# Patient Record
Sex: MALE | Race: WHITE
[De-identification: names, ages, dates, MRNs, and addresses within clinical notes are randomized per-mention and may not be internally consistent; named-entity substitution may affect disease eponyms.]

---

## 2018-06-06 ENCOUNTER — HOSPITAL ENCOUNTER (INPATIENT)
Dept: HOSPITAL 74 - YASAS | Age: 58
LOS: 2 days | Discharge: LEFT BEFORE DISCHARGE | DRG: 770 | End: 2018-06-08
Attending: SURGERY | Admitting: SURGERY
Payer: COMMERCIAL

## 2018-06-06 VITALS — BODY MASS INDEX: 22.1 KG/M2

## 2018-06-06 DIAGNOSIS — J42: ICD-10-CM

## 2018-06-06 DIAGNOSIS — F14.20: ICD-10-CM

## 2018-06-06 DIAGNOSIS — I25.2: ICD-10-CM

## 2018-06-06 DIAGNOSIS — F19.24: ICD-10-CM

## 2018-06-06 DIAGNOSIS — B18.2: ICD-10-CM

## 2018-06-06 DIAGNOSIS — F10.230: ICD-10-CM

## 2018-06-06 DIAGNOSIS — E11.9: ICD-10-CM

## 2018-06-06 DIAGNOSIS — Z21: ICD-10-CM

## 2018-06-06 DIAGNOSIS — Z87.898: ICD-10-CM

## 2018-06-06 DIAGNOSIS — I25.10: ICD-10-CM

## 2018-06-06 DIAGNOSIS — G62.9: ICD-10-CM

## 2018-06-06 DIAGNOSIS — J45.909: ICD-10-CM

## 2018-06-06 DIAGNOSIS — F13.230: ICD-10-CM

## 2018-06-06 DIAGNOSIS — M21.331: ICD-10-CM

## 2018-06-06 DIAGNOSIS — F11.23: Primary | ICD-10-CM

## 2018-06-06 DIAGNOSIS — F17.210: ICD-10-CM

## 2018-06-06 RX ADMIN — ROSUVASTATIN CALCIUM SCH MG: 20 TABLET, FILM COATED ORAL at 18:54

## 2018-06-06 RX ADMIN — SULFAMETHOXAZOLE AND TRIMETHOPRIM SCH EACH: 800; 160 TABLET ORAL at 18:34

## 2018-06-06 RX ADMIN — BACLOFEN SCH MG: 10 TABLET ORAL at 22:50

## 2018-06-06 RX ADMIN — Medication SCH MG: at 22:49

## 2018-06-06 RX ADMIN — GABAPENTIN SCH MG: 300 CAPSULE ORAL at 22:49

## 2018-06-06 RX ADMIN — LISINOPRIL SCH MG: 5 TABLET ORAL at 18:35
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
## 2018-06-07 LAB
ALBUMIN SERPL-MCNC: 3.1 G/DL (ref 3.4–5)
ALP SERPL-CCNC: 116 U/L (ref 45–117)
ALT SERPL-CCNC: 30 U/L (ref 12–78)
ANION GAP SERPL CALC-SCNC: 9 MMOL/L (ref 8–16)
APPEARANCE UR: CLEAR
AST SERPL-CCNC: 30 U/L (ref 15–37)
BILIRUB SERPL-MCNC: 0.4 MG/DL (ref 0.2–1)
BILIRUB UR STRIP.AUTO-MCNC: NEGATIVE MG/DL
BUN SERPL-MCNC: 20 MG/DL (ref 7–18)
CALCIUM SERPL-MCNC: 8.8 MG/DL (ref 8.5–10.1)
CHLORIDE SERPL-SCNC: 101 MMOL/L (ref 98–107)
CO2 SERPL-SCNC: 27 MMOL/L (ref 21–32)
COLOR UR: (no result)
CREAT SERPL-MCNC: 0.8 MG/DL (ref 0.7–1.3)
DEPRECATED RDW RBC AUTO: 13.4 % (ref 11.9–15.9)
EPITH CASTS URNS QL MICRO: (no result) /HPF
GLUCOSE SERPL-MCNC: 72 MG/DL (ref 74–106)
HCT VFR BLD CALC: 38.8 % (ref 35.4–49)
HGB BLD-MCNC: 12.8 GM/DL (ref 11.7–16.9)
KETONES UR QL STRIP: NEGATIVE
LEUKOCYTE ESTERASE UR QL STRIP.AUTO: NEGATIVE
MCH RBC QN AUTO: 26.7 PG (ref 25.7–33.7)
MCHC RBC AUTO-ENTMCNC: 33.1 G/DL (ref 32–35.9)
MCV RBC: 80.6 FL (ref 80–96)
MUCOUS THREADS URNS QL MICRO: (no result)
NITRITE UR QL STRIP: NEGATIVE
PH UR: 5 [PH] (ref 5–8)
PLATELET # BLD AUTO: 130 K/MM3 (ref 134–434)
PMV BLD: 9.6 FL (ref 7.5–11.1)
POTASSIUM SERPLBLD-SCNC: 4.2 MMOL/L (ref 3.5–5.1)
PROT SERPL-MCNC: 7.5 G/DL (ref 6.4–8.2)
PROT UR QL STRIP: (no result)
PROT UR QL STRIP: NEGATIVE
RBC # BLD AUTO: 4.82 M/MM3 (ref 4–5.6)
RBC # UR STRIP: NEGATIVE /UL
SODIUM SERPL-SCNC: 137 MMOL/L (ref 136–145)
SP GR UR: 1.03 (ref 1–1.03)
UROBILINOGEN UR STRIP-MCNC: 2 MG/DL (ref 0.2–1)
WBC # BLD AUTO: 2.8 K/MM3 (ref 4–10)

## 2018-06-07 PROCEDURE — HZ2ZZZZ DETOXIFICATION SERVICES FOR SUBSTANCE ABUSE TREATMENT: ICD-10-PCS | Performed by: SURGERY

## 2018-06-07 RX ADMIN — LISINOPRIL SCH MG: 5 TABLET ORAL at 10:33

## 2018-06-07 RX ADMIN — ROSUVASTATIN CALCIUM SCH MG: 20 TABLET, FILM COATED ORAL at 10:29

## 2018-06-07 RX ADMIN — GABAPENTIN SCH MG: 300 CAPSULE ORAL at 14:46

## 2018-06-07 RX ADMIN — BACLOFEN SCH MG: 10 TABLET ORAL at 22:10

## 2018-06-07 RX ADMIN — NICOTINE SCH MG: 14 PATCH, EXTENDED RELEASE TRANSDERMAL at 10:29

## 2018-06-07 RX ADMIN — SULFAMETHOXAZOLE AND TRIMETHOPRIM SCH EACH: 800; 160 TABLET ORAL at 10:29

## 2018-06-07 RX ADMIN — Medication SCH TAB: at 10:29

## 2018-06-07 RX ADMIN — CLOPIDOGREL BISULFATE SCH MG: 75 TABLET, FILM COATED ORAL at 10:29

## 2018-06-07 RX ADMIN — GABAPENTIN SCH MG: 300 CAPSULE ORAL at 22:10

## 2018-06-07 RX ADMIN — ASPIRIN SCH MG: 81 TABLET, COATED ORAL at 10:29

## 2018-06-07 RX ADMIN — GABAPENTIN SCH MG: 300 CAPSULE ORAL at 06:04

## 2018-06-07 RX ADMIN — Medication SCH MG: at 22:10

## 2018-06-07 RX ADMIN — BACLOFEN SCH MG: 10 TABLET ORAL at 10:29

## 2018-06-07 NOTE — EKG
Test Reason : 

Blood Pressure : ***/*** mmHG

Vent. Rate : 079 BPM     Atrial Rate : 079 BPM

   P-R Int : 158 ms          QRS Dur : 092 ms

    QT Int : 362 ms       P-R-T Axes : 084 058 -56 degrees

   QTc Int : 415 ms

 

NORMAL SINUS RHYTHM

VOLTAGE CRITERIA FOR LEFT VENTRICULAR HYPERTROPHY

NONSPECIFIC T WAVE ABNORMALITY

ABNORMAL ECG

NO PREVIOUS ECGS AVAILABLE

Confirmed by TYLER FUNEZ MD ([id number]) on 6/7/2018 11:43:29 AM

 

Referred By:             Confirmed By:TYLER FUNEZ MD
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
## 2018-06-07 NOTE — PN
S CIWA





- CIWA Score


Nausea/Vomitin-No Nausea/No Vomiting


Muscle Tremors: 4-Moderate,w/Arms Extend


Anxiety: 4-Mod. Anxious/Guarded


Agitation: 4-Moderately Restless


Paroxysmal Sweats: 1-Minimal Palms Moist


Orientation: 0-Oriented


Tacttile Disturbances: 0-None


Auditory Disturbances: 0-None


Visual Disturbances: 0-None


Headache: 0-None Present


CIWA-Ar Total Score: 13





BHS COWS





- Scale


Resting Pulse: 0= NC 80 or Below


Sweatin= Chills/Flushing


Restless Observation: 3= Extraneous Movement


Pupil Size: 2= Moderately Dilated


Bone or Joint Aches: 4=Acute Joint/Muscle Pain


Runny Nose/ Eye Tearin= Nasal Congestion


GI Upset > 30mins: 0= None


Tremor Observation of Outstretched Hands: 1= Tremor Felt, Not Seen


Yawning Observation: 0= None


Anxiety or Irritability: 2=Irritable/Anxious


Goose Flesh Skin: 0=Smooth Skin


COWS Score: 14





BHS Progress Note (SOAP)


Subjective: 





ANXIETY,IRRITABILITY. C/O WAKING UP WITH RIGHT WRIST TO FINGER NUMBNESS/SLIGHT 

PAIN AND UNABLE TO FLEX WRIST.


Objective: 





18 14:52


 Vital Signs











  18





  09:55 14:43


 


Temperature 98.1 F 98.3 F


 


Pulse Rate 53 L 66


 


Respiratory 18 18





Rate  


 


Blood Pressure 110/54 95/63








 Laboratory Tests











  18





  22:00 08:00 08:00


 


WBC   2.8 L 


 


RBC   4.82 


 


Hgb   12.8 


 


Hct   38.8 


 


MCV   80.6 


 


MCH   26.7 


 


MCHC   33.1 


 


RDW   13.4 


 


Plt Count   130 L 


 


MPV   9.6 


 


Sodium    137


 


Potassium    4.2


 


Chloride    101


 


Carbon Dioxide    27


 


Anion Gap    9


 


BUN    20 H


 


Creatinine    0.8


 


Creat Clearance w eGFR    > 60


 


Random Glucose    72 L


 


Calcium    8.8


 


Total Bilirubin    0.4


 


AST    30


 


ALT    30


 


Alkaline Phosphatase    116


 


Total Protein    7.5


 


Albumin    3.1 L


 


Urine Color  Laura  


 


Urine Appearance  Clear  


 


Urine pH  5.0  


 


Ur Specific Gravity  1.029  


 


Urine Protein  2+ H  


 


Urine Glucose (UA)  Negative  


 


Urine Ketones  Negative  


 


Urine Blood  Negative  


 


Urine Nitrite  Negative  


 


Urine Bilirubin  Negative  


 


Urine Urobilinogen  2.0  


 


Ur Leukocyte Esterase  Negative  


 


Urine WBC (Auto)  1  


 


Urine RBC (Auto)  2  


 


Ur Epithelial Cells  Rare  


 


Urine Mucus  Many  











Assessment: 





18 14:52


WITHDRAWAL SX


WRIST DROP





Plan: 





CONTINUE DETOX


MOTRIN PRN


SELF MASSAGE OF AREA/PASSIVE ROM

## 2018-06-08 VITALS — HEART RATE: 60 BPM | SYSTOLIC BLOOD PRESSURE: 104 MMHG | DIASTOLIC BLOOD PRESSURE: 64 MMHG

## 2018-06-08 VITALS — TEMPERATURE: 97 F

## 2018-06-08 RX ADMIN — BACLOFEN SCH MG: 10 TABLET ORAL at 10:17

## 2018-06-08 RX ADMIN — ROSUVASTATIN CALCIUM SCH MG: 20 TABLET, FILM COATED ORAL at 10:17

## 2018-06-08 RX ADMIN — CLOPIDOGREL BISULFATE SCH MG: 75 TABLET, FILM COATED ORAL at 10:17

## 2018-06-08 RX ADMIN — GABAPENTIN SCH MG: 300 CAPSULE ORAL at 05:39

## 2018-06-08 RX ADMIN — ASPIRIN SCH MG: 81 TABLET, COATED ORAL at 10:17

## 2018-06-08 RX ADMIN — Medication SCH TAB: at 10:17

## 2018-06-08 RX ADMIN — NICOTINE SCH MG: 14 PATCH, EXTENDED RELEASE TRANSDERMAL at 10:18

## 2018-06-08 RX ADMIN — SULFAMETHOXAZOLE AND TRIMETHOPRIM SCH EACH: 800; 160 TABLET ORAL at 10:17

## 2018-06-08 RX ADMIN — LISINOPRIL SCH: 5 TABLET ORAL at 10:18

## 2018-06-08 RX ADMIN — GABAPENTIN SCH MG: 300 CAPSULE ORAL at 14:31
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
## 2018-06-08 NOTE — PN
S CIWA





- CIWA Score


Nausea/Vomitin-No Nausea/No Vomiting


Muscle Tremors: 4-Moderate,w/Arms Extend


Anxiety: 4-Mod. Anxious/Guarded


Agitation: 4-Moderately Restless


Paroxysmal Sweats: 1-Minimal Palms Moist


Orientation: 0-Oriented


Tacttile Disturbances: 0-None


Auditory Disturbances: 0-None


Visual Disturbances: 0-None


Headache: 0-None Present


CIWA-Ar Total Score: 13





BHS COWS





- Scale


Resting Pulse: 0= RI 80 or Below


Sweatin= Chills/Flushing


Restless Observation: 3= Extraneous Movement


Pupil Size: 2= Moderately Dilated


Bone or Joint Aches: 4=Acute Joint/Muscle Pain


Runny Nose/ Eye Tearin= None


GI Upset > 30mins: 0= None


Tremor Observation of Outstretched Hands: 1= Tremor Felt, Not Seen


Yawning Observation: 0= None


Anxiety or Irritability: 2=Irritable/Anxious


Goose Flesh Skin: 0=Smooth Skin


COWS Score: 13





BHS Progress Note (SOAP)


Subjective: 





ANXIETY,IRRITABILITY. INTERMITTENT NODDING/FALLING ASLEEP WHILE STANDING. 

REQUESTING XRAY OF RIGHT WRIST SAYING HE IS NOT SURE WHAT'S HAPPENING. PT 

APPEARS TO HAVE LAPSES IN MEMORY ON MEDICAL HX NARRATIVE TO STAFF. 


P.S: PT WAS TEAMED TODAY BY WRITER, NURSE DIAZ AND HIS COUNSELOR ZAHRAA HUERTA RE:CONTRABAND PILLS AND CIGARETTE BROUGHT INTO THE UNIT AS WELL AS 

SMOKING IN THE ROOM. PT VERBALIZED  UNDERSTANDING AND  IMPLICATION OF SUCH  

CONTRABAND USE WHILE IN TREATMENT. 





Objective: 





18 12:47


 Vital Signs











  18





  06:34 09:59


 


Temperature 97.8 F 98.7 F


 


Pulse Rate 59 L 61


 


Respiratory 16 18





Rate  


 


Blood Pressure 98/57 111/63








 Laboratory Tests











  18





  22:00 08:00 08:00


 


WBC   2.8 L 


 


RBC   4.82 


 


Hgb   12.8 


 


Hct   38.8 


 


MCV   80.6 


 


MCH   26.7 


 


MCHC   33.1 


 


RDW   13.4 


 


Plt Count   130 L 


 


MPV   9.6 


 


Sodium    137


 


Potassium    4.2


 


Chloride    101


 


Carbon Dioxide    27


 


Anion Gap    9


 


BUN    20 H


 


Creatinine    0.8


 


Creat Clearance w eGFR    > 60


 


Random Glucose    72 L


 


Calcium    8.8


 


Total Bilirubin    0.4


 


AST    30


 


ALT    30


 


Alkaline Phosphatase    116


 


Total Protein    7.5


 


Albumin    3.1 L


 


Urine Color  Laura  


 


Urine Appearance  Clear  


 


Urine pH  5.0  


 


Ur Specific Gravity  1.029  


 


Urine Protein  2+ H  


 


Urine Glucose (UA)  Negative  


 


Urine Ketones  Negative  


 


Urine Blood  Negative  


 


Urine Nitrite  Negative  


 


Urine Bilirubin  Negative  


 


Urine Urobilinogen  2.0  


 


Ur Leukocyte Esterase  Negative  


 


Urine WBC (Auto)  1  


 


Urine RBC (Auto)  2  


 


Ur Epithelial Cells  Rare  


 


Urine Mucus  Many  


 


RPR Titer   














  18





  08:00


 


WBC 


 


RBC 


 


Hgb 


 


Hct 


 


MCV 


 


MCH 


 


MCHC 


 


RDW 


 


Plt Count 


 


MPV 


 


Sodium 


 


Potassium 


 


Chloride 


 


Carbon Dioxide 


 


Anion Gap 


 


BUN 


 


Creatinine 


 


Creat Clearance w eGFR 


 


Random Glucose 


 


Calcium 


 


Total Bilirubin 


 


AST 


 


ALT 


 


Alkaline Phosphatase 


 


Total Protein 


 


Albumin 


 


Urine Color 


 


Urine Appearance 


 


Urine pH 


 


Ur Specific Gravity 


 


Urine Protein 


 


Urine Glucose (UA) 


 


Urine Ketones 


 


Urine Blood 


 


Urine Nitrite 


 


Urine Bilirubin 


 


Urine Urobilinogen 


 


Ur Leukocyte Esterase 


 


Urine WBC (Auto) 


 


Urine RBC (Auto) 


 


Ur Epithelial Cells 


 


Urine Mucus 


 


RPR Titer  Nonreactive








A SMALL SCAB WITH OLD DRY BLOOD ON NOSE BRIDGE.





RIGHT HAND: NO TRUAMA-SWELLING OR REDNESS NOTED. ACTIVE ROM TODAY.


18 12:52





18 13:14





Assessment: 





18 12:49


WITHDRAWAL SX


Plan: 





CONTINUE DETOX


XRAY RIGHT WRIST TODAY R/O PATHOLOGY.


MONITOR PT'S BEHAVIOR AND REDIRECT AS NEEDED

## 2021-08-02 ENCOUNTER — HOSPITAL ENCOUNTER (INPATIENT)
Dept: HOSPITAL 74 - YASAS | Age: 61
LOS: 1 days | Discharge: LEFT BEFORE DISCHARGE | DRG: 770 | End: 2021-08-03
Attending: ALLERGY & IMMUNOLOGY | Admitting: ALLERGY & IMMUNOLOGY
Payer: COMMERCIAL

## 2021-08-02 VITALS — BODY MASS INDEX: 21.8 KG/M2

## 2021-08-02 DIAGNOSIS — Z21: ICD-10-CM

## 2021-08-02 DIAGNOSIS — F17.210: ICD-10-CM

## 2021-08-02 DIAGNOSIS — I25.10: ICD-10-CM

## 2021-08-02 DIAGNOSIS — F11.23: Primary | ICD-10-CM

## 2021-08-02 DIAGNOSIS — F14.20: ICD-10-CM

## 2021-08-02 DIAGNOSIS — R60.0: ICD-10-CM

## 2021-08-02 PROCEDURE — U0003 INFECTIOUS AGENT DETECTION BY NUCLEIC ACID (DNA OR RNA); SEVERE ACUTE RESPIRATORY SYNDROME CORONAVIRUS 2 (SARS-COV-2) (CORONAVIRUS DISEASE [COVID-19]), AMPLIFIED PROBE TECHNIQUE, MAKING USE OF HIGH THROUGHPUT TECHNOLOGIES AS DESCRIBED BY CMS-2020-01-R: HCPCS

## 2021-08-02 PROCEDURE — HZ2ZZZZ DETOXIFICATION SERVICES FOR SUBSTANCE ABUSE TREATMENT: ICD-10-PCS | Performed by: ALLERGY & IMMUNOLOGY

## 2021-08-02 PROCEDURE — U0005 INFEC AGEN DETEC AMPLI PROBE: HCPCS

## 2021-08-02 PROCEDURE — C9803 HOPD COVID-19 SPEC COLLECT: HCPCS

## 2021-08-02 RX ADMIN — Medication SCH MG: at 23:19

## 2021-08-02 RX ADMIN — Medication SCH MG: at 23:18

## 2021-08-03 VITALS — DIASTOLIC BLOOD PRESSURE: 83 MMHG | HEART RATE: 64 BPM | SYSTOLIC BLOOD PRESSURE: 134 MMHG | TEMPERATURE: 96.8 F

## 2021-08-03 LAB
ALBUMIN SERPL-MCNC: 2.6 G/DL (ref 3.4–5)
ALP SERPL-CCNC: 95 U/L (ref 45–117)
ALT SERPL-CCNC: 18 U/L (ref 13–61)
ANION GAP SERPL CALC-SCNC: 3 MMOL/L (ref 8–16)
AST SERPL-CCNC: 21 U/L (ref 15–37)
BILIRUB SERPL-MCNC: 0.3 MG/DL (ref 0.2–1)
BUN SERPL-MCNC: 17.9 MG/DL (ref 7–18)
CALCIUM SERPL-MCNC: 8 MG/DL (ref 8.5–10.1)
CHLORIDE SERPL-SCNC: 105 MMOL/L (ref 98–107)
CO2 SERPL-SCNC: 31 MMOL/L (ref 21–32)
CREAT SERPL-MCNC: 0.8 MG/DL (ref 0.55–1.3)
DEPRECATED RDW RBC AUTO: 13.4 % (ref 11.9–15.9)
GLUCOSE SERPL-MCNC: 78 MG/DL (ref 74–106)
HCT VFR BLD CALC: 33 % (ref 35.4–49)
HGB BLD-MCNC: 11.1 GM/DL (ref 11.7–16.9)
MCH RBC QN AUTO: 27.2 PG (ref 25.7–33.7)
MCHC RBC AUTO-ENTMCNC: 33.7 G/DL (ref 32–35.9)
MCV RBC: 80.7 FL (ref 80–96)
PLATELET # BLD AUTO: 127 10^3/UL (ref 134–434)
PMV BLD: 9.8 FL (ref 7.5–11.1)
PROT SERPL-MCNC: 7.4 G/DL (ref 6.4–8.2)
RBC # BLD AUTO: 4.09 M/MM3 (ref 4–5.6)
SODIUM SERPL-SCNC: 140 MMOL/L (ref 136–145)
WBC # BLD AUTO: 3.2 K/MM3 (ref 4–10)

## 2021-08-03 RX ADMIN — Medication SCH MG: at 22:24

## 2021-08-03 RX ADMIN — NAPROXEN SCH MG: 500 TABLET ORAL at 10:25

## 2021-08-03 RX ADMIN — NAPROXEN SCH MG: 500 TABLET ORAL at 22:24

## 2021-08-03 RX ADMIN — Medication SCH MG: at 22:23

## 2022-04-18 ENCOUNTER — EMERGENCY (EMERGENCY)
Facility: HOSPITAL | Age: 62
LOS: 1 days | Discharge: HOME | End: 2022-04-18
Admitting: EMERGENCY MEDICINE
Payer: MEDICAID

## 2022-04-18 DIAGNOSIS — G62.9 POLYNEUROPATHY, UNSPECIFIED: ICD-10-CM

## 2022-04-18 DIAGNOSIS — M79.604 PAIN IN RIGHT LEG: ICD-10-CM

## 2022-04-18 DIAGNOSIS — F19.239 OTHER PSYCHOACTIVE SUBSTANCE DEPENDENCE WITH WITHDRAWAL, UNSPECIFIED: ICD-10-CM

## 2022-04-18 DIAGNOSIS — M79.605 PAIN IN LEFT LEG: ICD-10-CM

## 2022-04-18 DIAGNOSIS — G89.29 OTHER CHRONIC PAIN: ICD-10-CM

## 2022-04-18 PROCEDURE — 99284 EMERGENCY DEPT VISIT MOD MDM: CPT

## 2022-04-18 PROCEDURE — 99053 MED SERV 10PM-8AM 24 HR FAC: CPT
